# Patient Record
Sex: FEMALE | Race: WHITE | NOT HISPANIC OR LATINO
[De-identification: names, ages, dates, MRNs, and addresses within clinical notes are randomized per-mention and may not be internally consistent; named-entity substitution may affect disease eponyms.]

---

## 2022-12-29 ENCOUNTER — APPOINTMENT (OUTPATIENT)
Dept: ORTHOPEDIC SURGERY | Facility: CLINIC | Age: 57
End: 2022-12-29
Payer: OTHER MISCELLANEOUS

## 2022-12-29 VITALS — BODY MASS INDEX: 27.94 KG/M2 | HEIGHT: 61 IN | WEIGHT: 148 LBS

## 2022-12-29 DIAGNOSIS — Z78.9 OTHER SPECIFIED HEALTH STATUS: ICD-10-CM

## 2022-12-29 PROCEDURE — 73560 X-RAY EXAM OF KNEE 1 OR 2: CPT | Mod: LT

## 2022-12-29 PROCEDURE — 99072 ADDL SUPL MATRL&STAF TM PHE: CPT

## 2022-12-29 PROCEDURE — 99203 OFFICE O/P NEW LOW 30 MIN: CPT

## 2022-12-29 PROCEDURE — 20610 DRAIN/INJ JOINT/BURSA W/O US: CPT

## 2022-12-29 RX ORDER — IBUPROFEN AND FAMOTIDINE 800; 26.6 MG/1; MG/1
800-26.6 TABLET, COATED ORAL
Refills: 0 | Status: ACTIVE | COMMUNITY

## 2022-12-29 NOTE — PROCEDURE
[Large Joint Injection] : Large joint injection [Left] : of the left [Knee] : knee [Pain] : pain [Inflammation] : inflammation [X-ray evidence of Osteoarthritis on this or prior visit] : x-ray evidence of Osteoarthritis on this or prior visit [Betadine] : betadine [Ethyl Chloride sprayed topically] : ethyl chloride sprayed topically [Sterile technique used] : sterile technique used [___ cc    6mg] :  Betamethasone (Celestone) ~Vcc of 6mg [___ cc    1%] : Lidocaine ~Vcc of 1%  [] : Patient tolerated procedure well [Call if redness, pain or fever occur] : call if redness, pain or fever occur [Apply ice for 15min out of every hour for the next 12-24 hours as tolerated] : apply ice for 15 minutes out of every hour for the next 12-24 hours as tolerated [Previous OTC use and PT nontherapeutic] : patient has tried OTC's including aspirin, Ibuprofen, Aleve, etc or prescription NSAIDS, and/or exercises at home and/or physical therapy without satisfactory response [Patient had decreased mobility in the joint] : patient had decreased mobility in the joint [Risks, benefits, alternatives discussed / Verbal consent obtained] : the risks benefits, and alternatives have been discussed, and verbal consent was obtained

## 2022-12-29 NOTE — PHYSICAL EXAM
[Left] : left knee [AP] : anteroposterior [Lateral] : lateral [There are no fractures, subluxations or dislocations. No significant abnormalities are seen] : There are no fractures, subluxations or dislocations. No significant abnormalities are seen [Moderate patellofemoral OA] : Moderate patellofemoral OA [] : no calf tenderness [FreeTextEntry9] : bicompartmental lateral and patellofemoral DJD [TWNoteComboBox7] : flexion 115 degrees [de-identified] : extension 5 degrees

## 2022-12-29 NOTE — HISTORY OF PRESENT ILLNESS
[Work related] : work related [Sudden] : sudden [10] : 10 [9] : 9 [Sharp] : sharp [Throbbing] : throbbing [Constant] : constant [Sleep] : sleep [Ice] : ice [Heat] : heat [Walking] : walking [Lying in bed] : lying in bed [Full time] : Work status: full time [de-identified] : Patient presents today with left knee pain after slipping and falling at work on 3/16/18 WC INJURY. Saw Dr. Montez, had sx, completed PT and was getting CSIs. Experiencing localized pain. Taking Duexis with no relief. Denies recent imaging. 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

## 2022-12-29 NOTE — DISCUSSION/SUMMARY
[de-identified] : Options were discussed in length including NSAIDS, anti-inflammatories, oral steroids, physical therapy, CSI, or MRI. \par Pt received CSI in left knee in office today. pt tolerated procedure well and was advised to ice knee\par follow up prn\par \par Entered by Hermelinda Davenport acting as Scribe. \par Dr. Rico Attestation\par The documentation recorded by the scribe, in my presence, accurately reflects the service I, Dr. Rico, personally performed, and the decisions made by me with my edits as appropriate.\par

## 2023-01-04 ENCOUNTER — APPOINTMENT (OUTPATIENT)
Dept: ORTHOPEDIC SURGERY | Facility: CLINIC | Age: 58
End: 2023-01-04

## 2023-06-14 ENCOUNTER — APPOINTMENT (OUTPATIENT)
Dept: ORTHOPEDIC SURGERY | Facility: CLINIC | Age: 58
End: 2023-06-14
Payer: OTHER MISCELLANEOUS

## 2023-06-14 PROCEDURE — 20610 DRAIN/INJ JOINT/BURSA W/O US: CPT

## 2023-06-14 PROCEDURE — 99213 OFFICE O/P EST LOW 20 MIN: CPT | Mod: 25

## 2023-06-14 NOTE — DISCUSSION/SUMMARY
[de-identified] : Extensive discussion of the options was had with the patient. This discussion included both surgical and nonsurgical options. Options including but not limited to cortisone injection, viscosupplementation,  physical therapy, oral anti-inflammatories, MRI, arthroplasty and arthroscopy were discussed with the patient. We also discussed the option of observation, allowing the patient to continue conservative treatment and following up if the condition does not improve. Time was taken to go over any questions the patient had in regards to any of the treatment plans described. \par \par Patient was given a CSI in the left knee advised to ice if sore, and will f/u in 1 month

## 2023-06-14 NOTE — HISTORY OF PRESENT ILLNESS
[de-identified] : Patient is here today for her left knee. Patient last seen [facility] on 12/29/2023 and had a CSI.  Patient had Left knee scope by Dr Peterson and Dr Montez in the past.  She states the CSI gave her relief until recently . Her pain is medial and lateral aspect of the knee. The knee has been worse over the past 1 mos.

## 2023-06-14 NOTE — PHYSICAL EXAM
[de-identified] : Constitutional: The patient appears well developed, well nourished. Examination of patients ability to communicate functionally was normal. \par \par Neurologic: Coordination is normal. Alert and oriented to time, place and person. No evidence of mood disorder, calm affect. \par \par    LEFT   KNEE  : Inspection of the knee is as follows: moderate effusion. no ecchymosis, no streaking, no erythema, no atrophy, no deformities of the quad tendon and no deformities of patellar tendon. \par \par Palpation of the knee is as follows: medial joint line tenderness, lateral joint line tenderness, medial facet of patella tenderness, lateral facet of patella tenderness and crepitus. no palpable masses and no increased warmth. \par \par Knee Range of Motion is as follows in degrees:  \par \par Extension: 0 \par Flexion: 125  \par \par Strength examination of the knee is as follows: \par Quadriceps strength is 5/5 \par Hamstring strength is 5/5 \par \par Ligament Stability and Special Test ligamentously stable, negative anterior draw, negative Lachman test, negative posterior draw and no varus or valgus instability. \par negative McMurrays test and able to do active straight leg raise without an extensor lag. \par \par Neurological examination of the knee is as follows: light touch is intact throughout. \par \par Gait and function is as follows: mildly antalgic gait. \par

## 2023-09-13 ENCOUNTER — APPOINTMENT (OUTPATIENT)
Dept: ORTHOPEDIC SURGERY | Facility: CLINIC | Age: 58
End: 2023-09-13
Payer: OTHER MISCELLANEOUS

## 2023-09-13 VITALS — HEIGHT: 61 IN | BODY MASS INDEX: 27.94 KG/M2 | WEIGHT: 148 LBS

## 2023-09-13 DIAGNOSIS — M17.12 UNILATERAL PRIMARY OSTEOARTHRITIS, LEFT KNEE: ICD-10-CM

## 2023-09-13 PROCEDURE — 99213 OFFICE O/P EST LOW 20 MIN: CPT
